# Patient Record
Sex: FEMALE | Employment: FULL TIME | URBAN - METROPOLITAN AREA
[De-identification: names, ages, dates, MRNs, and addresses within clinical notes are randomized per-mention and may not be internally consistent; named-entity substitution may affect disease eponyms.]

---

## 2023-12-14 ENCOUNTER — MEDICAL CORRESPONDENCE (OUTPATIENT)
Dept: HEALTH INFORMATION MANAGEMENT | Facility: CLINIC | Age: 28
End: 2023-12-14

## 2023-12-15 ENCOUNTER — TRANSCRIBE ORDERS (OUTPATIENT)
Dept: OTHER | Age: 28
End: 2023-12-15

## 2023-12-15 DIAGNOSIS — G43.109 MIGRAINE WITH AURA AND WITHOUT STATUS MIGRAINOSUS, NOT INTRACTABLE: Primary | ICD-10-CM

## 2024-10-14 ENCOUNTER — LAB REQUISITION (OUTPATIENT)
Dept: LAB | Facility: CLINIC | Age: 29
End: 2024-10-14

## 2024-10-14 PROCEDURE — 87635 SARS-COV-2 COVID-19 AMP PRB: CPT | Performed by: INTERNAL MEDICINE

## 2024-10-15 LAB — SARS-COV-2 RNA RESP QL NAA+PROBE: POSITIVE

## 2025-04-03 ENCOUNTER — APPOINTMENT (OUTPATIENT)
Dept: GENERAL RADIOLOGY | Facility: CLINIC | Age: 30
End: 2025-04-03
Attending: EMERGENCY MEDICINE
Payer: COMMERCIAL

## 2025-04-03 ENCOUNTER — HOSPITAL ENCOUNTER (EMERGENCY)
Facility: CLINIC | Age: 30
Discharge: HOME | End: 2025-04-03
Attending: EMERGENCY MEDICINE
Payer: COMMERCIAL

## 2025-04-03 VITALS
OXYGEN SATURATION: 98 % | HEART RATE: 51 BPM | RESPIRATION RATE: 16 BRPM | HEIGHT: 64 IN | SYSTOLIC BLOOD PRESSURE: 131 MMHG | DIASTOLIC BLOOD PRESSURE: 84 MMHG | WEIGHT: 140 LBS | BODY MASS INDEX: 23.9 KG/M2 | TEMPERATURE: 97.6 F

## 2025-04-03 DIAGNOSIS — S92.354A CLOSED NONDISPLACED FRACTURE OF FIFTH METATARSAL BONE OF RIGHT FOOT, INITIAL ENCOUNTER: ICD-10-CM

## 2025-04-03 PROCEDURE — 99283 EMERGENCY DEPT VISIT LOW MDM: CPT | Performed by: EMERGENCY MEDICINE

## 2025-04-03 PROCEDURE — 73630 X-RAY EXAM OF FOOT: CPT | Mod: 26 | Performed by: RADIOLOGY

## 2025-04-03 PROCEDURE — 99284 EMERGENCY DEPT VISIT MOD MDM: CPT | Mod: 25 | Performed by: EMERGENCY MEDICINE

## 2025-04-03 PROCEDURE — 28470 CLTX METATARSAL FX WO MNP EA: CPT | Mod: T9 | Performed by: EMERGENCY MEDICINE

## 2025-04-03 PROCEDURE — 73630 X-RAY EXAM OF FOOT: CPT | Mod: RT

## 2025-04-03 PROCEDURE — 99284 EMERGENCY DEPT VISIT MOD MDM: CPT | Performed by: EMERGENCY MEDICINE

## 2025-04-03 RX ORDER — OXYCODONE HYDROCHLORIDE 5 MG/1
5 TABLET ORAL EVERY 6 HOURS PRN
Qty: 12 TABLET | Refills: 0 | Status: SHIPPED | OUTPATIENT
Start: 2025-04-03 | End: 2025-04-06

## 2025-04-03 ASSESSMENT — ACTIVITIES OF DAILY LIVING (ADL)
ADLS_ACUITY_SCORE: 41
ADLS_ACUITY_SCORE: 41

## 2025-04-03 NOTE — ED TRIAGE NOTES
Pt presents with previously broken foot from Tues. Believes that it is possibly displaced. Pain has not been relieved.

## 2025-04-03 NOTE — ED PROVIDER NOTES
History     Chief Complaint   Patient presents with    Foot Pain     HPI  Dorothea Koenig is a 30 year old female with a past medical history of recent fifth metatarsal fracture of right foot who presents to the emergency department with a chief complaint of foot pain.  Patient fractured her foot on Tuesday and now believes that it is possibly displaced.***     XR FOOT RIGHT (4/2/2025)  There is an acute oblique fracture of the midshaft of the fifth metatarsal, relatively nondisplaced.     I have reviewed the Medications, Allergies, Past Medical and Surgical History, and Social History in the Epic system.    No past medical history on file.  No past surgical history on file.  No current facility-administered medications for this encounter.     No current outpatient medications on file.     No Known Allergies  Past medical history, past surgical history, medications, and allergies were reviewed with the patient. Additional pertinent items: None    Social History     Socioeconomic History    Marital status: Single     Spouse name: Not on file    Number of children: Not on file    Years of education: Not on file    Highest education level: Not on file   Occupational History    Not on file   Tobacco Use    Smoking status: Not on file    Smokeless tobacco: Not on file   Substance and Sexual Activity    Alcohol use: Not on file    Drug use: Not on file    Sexual activity: Not on file   Other Topics Concern    Not on file   Social History Narrative    Not on file     Social Drivers of Health     Financial Resource Strain: Not on file   Food Insecurity: Not on file   Transportation Needs: Not on file   Physical Activity: Not on file   Stress: Not on file   Social Connections: Not on file   Interpersonal Safety: Not on file   Housing Stability: Not on file     Social history was reviewed with the patient. Additional pertinent items: None    Review of Systems  A medically appropriate review of systems was performed with  "pertinent positives and negatives noted in the HPI, and all other systems negative.    Physical Exam   BP: 131/84  Pulse: 51  Temp: 97.6  F (36.4  C)  Resp: 16  Height: 162.6 cm (5' 4\")  Weight: 63.5 kg (140 lb)  SpO2: 98 %      General: Well nourished, well developed, NAD  HEENT: EOMI, anicteric. NCAT, MMM  Neck: no jugular venous distension, supple, nl ROM  Cardiac: Regular rate and rhythm. No murmurs, rubs, or gallops. Normal S1, S2.  Intact peripheral pulses  Pulm: CTAB, no stridor, wheezes, rales, rhonchi  Abd: Soft, nontender, nondistended.  No masses palpated.    Skin: Warm and dry to the touch.  No rash  Extremities: No LE edema, no cyanosis, w/w/p  Neuro: A&Ox3, no gross focal deficits    ED Course        Procedures           {ed addendum:993937::\" \"}  {trauma activation or Fall?:118171}  {ED Course Selections:454196}  {Sepsis/Septic Shock/Stemi/Stroke:423057::\"None\"}       Labs Ordered and Resulted from Time of ED Arrival to Time of ED Departure - No data to display         No results found for this or any previous visit (from the past 24 hours).    Labs, vital signs, and imaging studies were reviewed by me.    Medications - No data to display    Assessments & Plan (with Medical Decision Making)   Dorothea Koenig is a 30 year old female who ***    ***    {ED Critical Care Performed?:248742}    {*** images were personally reviewed by me, I agree with the radiology reads.}    I have reviewed the nursing notes.    I have reviewed the findings, diagnosis, plan and need for follow up with the patient.    {Patient to be discharged home. Advised to follow up with PCP within 1 week. To return to ER immediately with any new/worsening symptoms. Plan of care discussed with patient who expresses understanding and agrees with plan of care.}    {Patient and their further management were discussed with ***, to be admitted to their service. Plan was discussed with patient who understands and agrees with plan.}    New " Prescriptions    No medications on file       Final diagnoses:   None       CHEN CHACON MD  4/3/2025   formerly [facility] EMERGENCY DEPARTMENT     presentation was of low complexity (an acute and uncomplicated illness or injury).    The patient's evaluation involved:  review of external note(s) from 1 sources (outside ER notes, see above)  review of 1 test result(s) ordered prior to this encounter (right foot x-ray from 4/2/2025)  ordering and/or review of 1 test(s) in this encounter (see separate area of note for details)  independent interpretation of testing performed by another health professional (see separate area of note for details)  discussion of management or test interpretation with another health professional (orthopedic surgery)    The patient's management necessitated moderate risk (prescription drug management including medications given in the ED).    X-ray images were personally reviewed by me, I agree with the radiology reads.    I have reviewed the nursing notes.    I have reviewed the findings, diagnosis, plan and need for follow up with the patient.    Patient to be discharged home. Advised to follow up with orthopedic surgery in 1 to 2 weeks.  Referral was placed. To return to ER immediately with any new/worsening symptoms. Plan of care discussed with patient who expresses understanding and agrees with plan of care.  Patient provided with prescription for medication for pain relief at home.    Patient already has boot and crutches to use at home.    Discharge Medication List as of 4/3/2025  4:32 PM        START taking these medications    Details   oxyCODONE (ROXICODONE) 5 MG tablet Take 1 tablet (5 mg) by mouth every 6 hours as needed for pain., Disp-12 tablet, R-0, E-Prescribe             Final diagnoses:   Closed nondisplaced fracture of fifth metatarsal bone of right foot, initial encounter       CHEN STREET MD  4/3/2025   [facility] EMERGENCY DEPARTMENT       Chen Street MD  04/04/25 1014

## 2025-04-03 NOTE — DISCHARGE INSTRUCTIONS
TODAY'S VISIT:  You were seen today for foot fracture    - you may bear weight as tolerated with your boot on, use crutches as needed  -   - If you had any labs or imaging/radiology tests performed today, you should also discuss these tests with your usual provider.     FOLLOW-UP:  Please make an appointment to follow up with:  - Your Primary Care Provider. If you do not have a PCP, please call the Primary Care Center (phone: [phone] for an appointment  - Orthopedics Clinic (phone: [phone])     - Have your provider review the results from today's visit with you again to make sure no further follow-up or additional testing is needed based on those results.     PRESCRIPTIONS / MEDICATIONS:  - You may take Ibuprofen and/or Tylenol as needed for pain. You can take 600 mg of Ibuprofen every 6 hours and 1 g Tylenol every 6 hours. It may help to alternate these, so you take something every 3 hours.   - oxycodone as needed for pain control     RETURN TO THE EMERGENCY DEPARTMENT  Return to the Emergency Department at any time for any new or worsening symptoms or any concerns.

## 2025-04-06 ENCOUNTER — HEALTH MAINTENANCE LETTER (OUTPATIENT)
Age: 30
End: 2025-04-06

## 2025-04-07 NOTE — TELEPHONE ENCOUNTER
DIAGNOSIS: RIGHT FOOT   APPOINTMENT DATE: 04/17/2025   NOTES STATUS DETAILS   DISCHARGE REPORT from the ER Internal    Care Everywhere 4/3/2025 -  [facility] Emergency Department    4/2/2025 -  [facility] Emergency Department   XRAYS (IMAGES & REPORTS)  Internal: PACS  4/3/2025 - RT Foot    [facility]: View in CE  4/2/2025 - RT Foot

## 2025-04-17 ENCOUNTER — OFFICE VISIT (OUTPATIENT)
Dept: ORTHOPEDICS | Facility: CLINIC | Age: 30
End: 2025-04-17
Payer: COMMERCIAL

## 2025-04-17 ENCOUNTER — ANCILLARY PROCEDURE (OUTPATIENT)
Dept: GENERAL RADIOLOGY | Facility: CLINIC | Age: 30
End: 2025-04-17
Payer: COMMERCIAL

## 2025-04-17 ENCOUNTER — PRE VISIT (OUTPATIENT)
Dept: ORTHOPEDICS | Facility: CLINIC | Age: 30
End: 2025-04-17

## 2025-04-17 DIAGNOSIS — S92.354A NONDISPLACED FRACTURE OF FIFTH METATARSAL BONE, RIGHT FOOT, INITIAL ENCOUNTER FOR CLOSED FRACTURE: Primary | ICD-10-CM

## 2025-04-17 DIAGNOSIS — S92.354A NONDISPLACED FRACTURE OF FIFTH METATARSAL BONE, RIGHT FOOT, INITIAL ENCOUNTER FOR CLOSED FRACTURE: ICD-10-CM

## 2025-04-17 DIAGNOSIS — S92.354A CLOSED NONDISPLACED FRACTURE OF FIFTH METATARSAL BONE OF RIGHT FOOT, INITIAL ENCOUNTER: ICD-10-CM

## 2025-04-17 PROCEDURE — 99202 OFFICE O/P NEW SF 15 MIN: CPT

## 2025-04-17 PROCEDURE — 1125F AMNT PAIN NOTED PAIN PRSNT: CPT

## 2025-04-17 RX ORDER — TRAMADOL HYDROCHLORIDE 50 MG/1
50 TABLET ORAL EVERY 6 HOURS PRN
Qty: 10 TABLET | Refills: 0 | Status: SHIPPED | OUTPATIENT
Start: 2025-04-17 | End: 2025-04-20

## 2025-04-17 NOTE — NURSING NOTE
Reason For Visit:   Chief Complaint   Patient presents with    Surgical Followup     ED follow up right 5th MT fracture.  Should get new right foot, weightbearing 3 view foot XR.       There were no vitals taken for this visit.    Pain Assessment  Patient Currently in Pain: Yes  0-10 Pain Scale: 3  Primary Pain Location: Foot    Elizabteh HectoroORI

## 2025-04-17 NOTE — LETTER
4/17/2025      Dorothea Koenig  [address]  [place] [address]      Dear Colleague,    Thank you for referring your patient, Dorothea Koenig, to the [facility] [place]. Please see a copy of my visit note below.    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  [place] she was placed in an ill fitting splint.  She was later given a cam boot.  Presented to the [place] ED 4/3, x-ray showed a minimally displaced oblique fracture of the fifth metatarsal shaft.  She was advised to weight-bear as tolerated in a cam boot and follow-up for x-rays in 2 weeks.  Notes she has been able to ambulate in her boot, though does have significant pain at night after being on her feet all day.  She is interested in a knee scooter.  Otherwise no specific concerns today.  Exam:     General: Awake, Alert, and oriented. Articulates and communicates with a normal affect. Arrives in CAM boot. Ambulating without assistive device.      Imaging:  Radiographs of the right foot from 4/17/25 were independently reviewed by me and compared to prior. Findings were discussed with the patient today. The imaging demonstrates redemonstration of fifth metatarsal shaft fracture, in stable alignment.    Medications:   No current outpatient medications on file.    Assessment:  Pleasant 30-year-old female 2 weeks status post fifth metatarsal shaft fracture, in stable alignment.    Plan:   -Weight bearing: Okay to continue weightbearing as tolerated in a cam boot while out of the house, knee scooter ordered today.  Flat soled shoe provided for home  -Follow up: 6-week snehal for a follow-up x-ray, sooner if  needed    Shira Ragsdale PA-C 4/17/2025 1:08 PM  Orthopedic Surgery      Again, thank you for allowing me to  participate in the care of your patient.        Sincerely,        Shira Ragsdale PA-C    Electronically signed

## 2025-04-17 NOTE — PROGRESS NOTES
Chief Complaint:   ED follow up     Procedures:  ***    History:  Dorothea Koenig is a 30 year old       Exam:     General: Awake, Alert, and oriented. Articulates and communicates with a normal affect     *** Lower Extremity:  Incisions well healed without evidence of infection, no erythema, drainage, or wound dehiscence   Normal post-operative effusion and ecchymosis  Range of motion and stability exam not performed  TA/Gsc/EHL/FHL with 5/5 strength  Distal pulses palpable, toes are warm and well perfused   Gait: ***    Imaging:  Radiographs of the *** knee from **** were independently reviewed by me and findings were discussed with the patient today. The imaging demonstrates ***.    Medications:   No current outpatient medications on file.    Assessment:  Doing well *** weeks s/p ***. Basic wound cares were performed today. *** sutures were removed, I did not appreciate signs of infection. We discussed the plan below, all questions were answered to the best of my ability.     Plan:   -Weight bearing: WBAT ***  -Range of motion as tolerated ***  -Follow with physical therapy focusing on gait and stability***  -DVT prophylaxis: ***  -Follow up: 6 weeks with  ***, will obtain new XR    Shira Ragsdale PA-C 4/17/2025 1:08 PM  Orthopedic Surgery

## 2025-05-06 ENCOUNTER — OFFICE VISIT (OUTPATIENT)
Dept: ORTHOPEDICS | Facility: CLINIC | Age: 30
End: 2025-05-06
Payer: COMMERCIAL

## 2025-05-06 ENCOUNTER — ANCILLARY PROCEDURE (OUTPATIENT)
Dept: GENERAL RADIOLOGY | Facility: CLINIC | Age: 30
End: 2025-05-06
Payer: COMMERCIAL

## 2025-05-06 DIAGNOSIS — S92.354A CLOSED NONDISPLACED FRACTURE OF FIFTH METATARSAL BONE OF RIGHT FOOT, INITIAL ENCOUNTER: ICD-10-CM

## 2025-05-06 DIAGNOSIS — S92.354A CLOSED NONDISPLACED FRACTURE OF FIFTH METATARSAL BONE OF RIGHT FOOT, INITIAL ENCOUNTER: Primary | ICD-10-CM

## 2025-05-06 PROCEDURE — 73630 X-RAY EXAM OF FOOT: CPT | Mod: RT | Performed by: RADIOLOGY

## 2025-05-06 NOTE — NURSING NOTE
Reason For Visit:   Chief Complaint   Patient presents with    RECHECK     right 5th MT fracture.        There were no vitals taken for this visit.    Pain Assessment  0-10 Pain Scale: 1  Primary Pain Location: Foot    Elizabeth Hectoro, AIDANN

## 2025-05-06 NOTE — PROGRESS NOTES
Chief Complaint:   Follow up      History:  Dorothea Koenig is a 30 year old patient  5 weeks out from a right foot injury where she sustained a nondisplaced 5th metatarsal fracture. She has continued to do well. Ambulating without assistive device and notes she did not end up needing a knee scooter. She has transitioned to flat surgical shoe over the last week, wearing this at the hospital and at home. Denies any pain, not taking any pain medication. Denies any bruising or skin changes. Overall feeling well and does not have any specific questions or concerns.       Exam:     General: Awake, Alert, and oriented. Articulates and communicates with a normal affect     Right Lower Extremity:  Overall benign appearing, no significant swelling or ecchymosis  No tenderness to palpation over the fifth metatarsal, midfoot, or ankle  TA/Gsc/EHL/FHL with 5/5 strength, 5 out of 5 strength with inversion and eversion, nonpainful  Distal pulses palpable, toes are warm and well perfused   Gait: Symmetric without assistive device, postop shoe in place    Imaging:  Radiographs of the right foot from 5/6/25  were independently reviewed by me and compared to prior 4/2, 4/3, and 4/17. Findings were discussed with the patient today. The imaging demonstrates continued healing of the fifth metatarsal fracture without change in alignment.    Medications:   No current outpatient medications on file.    Assessment/Plan:  Pleasant 30 year old patient without significant PMH with healing nondisplaced fifth metatarsal fracture, 5 weeks from injury. We discussed wearing post op shoe for one more week, ok to wean from shoe as tolerated after the 6 week snehal. Ok to slowly advance activity as tolerated. 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 I suggested holding off on high impact sports such as running and jumping until at least the 12 week snehal. I do not not think formal physical therapy is necessary at this point.  We discussed this  plan, all questions were answered to the best of my ability. Vane expressed understanding and was in agreement      Shira Ragsdale PA-C 5/6/2025 3:25 PM  Orthopedic Surgery

## 2025-05-06 NOTE — LETTER
5/6/2025      Dorothea Koenig  [address]  [place] [address]      Dear Colleague,    Thank you for referring your patient, Dorothea Koenig, to the [facility] [place]. Please see a copy of my visit note below.    Chief Complaint:   Follow up      History:  Dorothea Koenig is a 30 year old patient  5 weeks out from a right foot injury where she sustained a nondisplaced 5th metatarsal fracture. She has continued to do well. Ambulating without assistive device and notes she did not end up needing a knee scooter. She has transitioned to flat surgical shoe over the last week, wearing this at the hospital and at home. Denies any pain, not taking any pain medication. Denies any bruising or skin changes. Overall feeling well and does not have any specific questions or concerns.       Exam:     General: Awake, Alert, and oriented. Articulates and communicates with a normal affect     Right Lower Extremity:  Overall benign appearing, no significant swelling or ecchymosis  No tenderness to palpation over the fifth metatarsal, midfoot, or ankle  TA/Gsc/EHL/FHL with 5/5 strength, 5 out of 5 strength with inversion and eversion, nonpainful  Distal pulses palpable, toes are warm and well perfused   Gait: Symmetric without assistive device, postop shoe in place    Imaging:  Radiographs of the right foot from 5/6/25  were independently reviewed by me and compared to prior 4/2, 4/3, and 4/17. Findings were discussed with the patient today. The imaging demonstrates continued healing of the fifth metatarsal fracture without change in alignment.    Medications:   No current outpatient medications on file.    Assessment/Plan:  Pleasant 30 year old patient without significant PMH with healing nondisplaced fifth metatarsal fracture, 5 weeks from injury. We discussed wearing post op shoe for one more week, ok to wean from shoe as tolerated after the 6 week snehal. Ok to slowly advance  activity as tolerated. 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 I suggested holding off on high impact sports such as running and jumping until at least the 12 week snehal. I do not not think formal physical therapy is necessary at this point.  We discussed this plan, all questions were answered to the best of my ability. Vane expressed understanding and was in agreement      Shira Ragsdale PA-C 5/6/2025 3:25 PM  Orthopedic Surgery      Again, thank you for allowing me to participate in the care of your patient.        Sincerely,        Shira Ragsdale PA-C    Electronically signed